# Patient Record
Sex: FEMALE | Race: BLACK OR AFRICAN AMERICAN | Employment: FULL TIME | URBAN - METROPOLITAN AREA
[De-identification: names, ages, dates, MRNs, and addresses within clinical notes are randomized per-mention and may not be internally consistent; named-entity substitution may affect disease eponyms.]

---

## 2017-02-10 ENCOUNTER — OFFICE VISIT (OUTPATIENT)
Dept: OBGYN CLINIC | Facility: CLINIC | Age: 37
End: 2017-02-10

## 2017-02-10 ENCOUNTER — APPOINTMENT (OUTPATIENT)
Dept: LAB | Facility: REFERENCE LAB | Age: 37
End: 2017-02-10
Attending: OBSTETRICS & GYNECOLOGY
Payer: COMMERCIAL

## 2017-02-10 VITALS
SYSTOLIC BLOOD PRESSURE: 112 MMHG | BODY MASS INDEX: 23.07 KG/M2 | DIASTOLIC BLOOD PRESSURE: 70 MMHG | WEIGHT: 135.13 LBS | HEIGHT: 64 IN

## 2017-02-10 DIAGNOSIS — T83.32XA MALPOSITIONED IUD, INITIAL ENCOUNTER: ICD-10-CM

## 2017-02-10 DIAGNOSIS — R35.0 FREQUENT URINATION: Primary | ICD-10-CM

## 2017-02-10 DIAGNOSIS — R35.0 FREQUENT URINATION: ICD-10-CM

## 2017-02-10 DIAGNOSIS — C50.919 MALIGNANT NEOPLASM OF FEMALE BREAST, UNSPECIFIED LATERALITY, UNSPECIFIED SITE OF BREAST: ICD-10-CM

## 2017-02-10 DIAGNOSIS — Z01.411 ABNORMAL GYNECOLOGICAL EXAMINATION: ICD-10-CM

## 2017-02-10 DIAGNOSIS — N93.9 VAGINAL SPOTTING: ICD-10-CM

## 2017-02-10 DIAGNOSIS — Z12.4 ROUTINE CERVICAL SMEAR: ICD-10-CM

## 2017-02-10 LAB
APPEARANCE: CLEAR
BILIRUBIN: NEGATIVE
GLUCOSE (URINE DIPSTICK): NEGATIVE MG/DL
KETONES (URINE DIPSTICK): NEGATIVE MG/DL
MULTISTIX LOT#: NORMAL NUMERIC
NITRITE, URINE: NEGATIVE
OCCULT BLOOD: NEGATIVE
PH, URINE: 7 (ref 4.5–8)
PROTEIN (URINE DIPSTICK): NEGATIVE MG/DL
SPECIFIC GRAVITY: 1.02 (ref 1–1.03)
URINE-COLOR: YELLOW
UROBILINOGEN,SEMI-QN: 1 MG/DL (ref 0–1.9)

## 2017-02-10 PROCEDURE — 81002 URINALYSIS NONAUTO W/O SCOPE: CPT | Performed by: OBSTETRICS & GYNECOLOGY

## 2017-02-10 PROCEDURE — 87077 CULTURE AEROBIC IDENTIFY: CPT

## 2017-02-10 PROCEDURE — 87086 URINE CULTURE/COLONY COUNT: CPT

## 2017-02-10 PROCEDURE — 88175 CYTOPATH C/V AUTO FLUID REDO: CPT | Performed by: OBSTETRICS & GYNECOLOGY

## 2017-02-10 PROCEDURE — 58301 REMOVE INTRAUTERINE DEVICE: CPT | Performed by: OBSTETRICS & GYNECOLOGY

## 2017-02-10 PROCEDURE — 99395 PREV VISIT EST AGE 18-39: CPT | Performed by: OBSTETRICS & GYNECOLOGY

## 2017-02-10 PROCEDURE — 87186 SC STD MICRODIL/AGAR DIL: CPT

## 2017-02-10 PROCEDURE — 87624 HPV HI-RISK TYP POOLED RSLT: CPT | Performed by: OBSTETRICS & GYNECOLOGY

## 2017-02-10 RX ORDER — TAMOXIFEN CITRATE 20 MG/1
TABLET ORAL
Refills: 11 | COMMUNITY
Start: 2017-02-01

## 2017-02-10 RX ORDER — GABAPENTIN 300 MG/1
CAPSULE ORAL
Refills: 11 | COMMUNITY
Start: 2017-02-01 | End: 2019-02-08

## 2017-02-10 RX ORDER — ZOLPIDEM TARTRATE 5 MG/1
TABLET ORAL
Refills: 5 | COMMUNITY
Start: 2017-01-09 | End: 2020-08-20 | Stop reason: DRUGHIGH

## 2017-02-10 NOTE — PROGRESS NOTES
GYN H&P     2/10/2017  3:23 PM    CC:Patient presents for annual exam    HPI: Patient is a 43year old  here for annual gyn exam with complaints of increased urinary frequency and recent postcoital vaginal spotting. Cycles absent on Tamoxifen.  No pe of Education: N/A  Number of Children: N/A     Occupational History  None on file     Social History Main Topics   Smoking status: Never Smoker     Smokeless tobacco: Not on file    Alcohol Use: No    Drug Use: No    Sexual Activity: No   No   Comment: [facility] laterality[facility]    3. Vaginal spotting    4. Malpositioned IUD, initial encounter    5. Abnormal gynecological examination          1. Frequent urination  -- Urine Dip in office [76263]  - Urinalysis, Routine;  Future  - Urine C

## 2017-02-13 LAB — HPV I/H RISK 1 DNA SPEC QL NAA+PROBE: NEGATIVE

## 2017-02-14 RX ORDER — NITROFURANTOIN 25; 75 MG/1; MG/1
100 CAPSULE ORAL 2 TIMES DAILY
Qty: 20 CAPSULE | Refills: 0 | Status: SHIPPED | OUTPATIENT
Start: 2017-02-14 | End: 2017-02-24

## 2017-02-17 ENCOUNTER — MED REC SCAN ONLY (OUTPATIENT)
Dept: OBGYN CLINIC | Facility: CLINIC | Age: 37
End: 2017-02-17

## 2019-02-08 ENCOUNTER — OFFICE VISIT (OUTPATIENT)
Dept: OBGYN CLINIC | Facility: CLINIC | Age: 39
End: 2019-02-08
Payer: COMMERCIAL

## 2019-02-08 ENCOUNTER — LAB ENCOUNTER (OUTPATIENT)
Dept: LAB | Facility: REFERENCE LAB | Age: 39
End: 2019-02-08
Attending: OBSTETRICS & GYNECOLOGY
Payer: COMMERCIAL

## 2019-02-08 VITALS
BODY MASS INDEX: 23.58 KG/M2 | DIASTOLIC BLOOD PRESSURE: 60 MMHG | WEIGHT: 138.13 LBS | HEIGHT: 64 IN | SYSTOLIC BLOOD PRESSURE: 108 MMHG

## 2019-02-08 DIAGNOSIS — Z11.3 SCREENING EXAMINATION FOR VENEREAL DISEASE: ICD-10-CM

## 2019-02-08 DIAGNOSIS — Z85.3 HISTORY OF BILATERAL BREAST CANCER: ICD-10-CM

## 2019-02-08 DIAGNOSIS — Z01.419 WOMEN'S ANNUAL ROUTINE GYNECOLOGICAL EXAMINATION: Primary | ICD-10-CM

## 2019-02-08 DIAGNOSIS — Z12.4 SCREENING FOR MALIGNANT NEOPLASM OF THE CERVIX: ICD-10-CM

## 2019-02-08 PROBLEM — C50.919 BREAST CANCER (HCC): Status: ACTIVE | Noted: 2017-02-10

## 2019-02-08 PROCEDURE — 87624 HPV HI-RISK TYP POOLED RSLT: CPT | Performed by: OBSTETRICS & GYNECOLOGY

## 2019-02-08 PROCEDURE — 87591 N.GONORRHOEAE DNA AMP PROB: CPT | Performed by: OBSTETRICS & GYNECOLOGY

## 2019-02-08 PROCEDURE — 99396 PREV VISIT EST AGE 40-64: CPT | Performed by: OBSTETRICS & GYNECOLOGY

## 2019-02-08 PROCEDURE — 36415 COLL VENOUS BLD VENIPUNCTURE: CPT

## 2019-02-08 PROCEDURE — 87491 CHLMYD TRACH DNA AMP PROBE: CPT | Performed by: OBSTETRICS & GYNECOLOGY

## 2019-02-08 PROCEDURE — 87389 HIV-1 AG W/HIV-1&-2 AB AG IA: CPT

## 2019-02-08 PROCEDURE — 86780 TREPONEMA PALLIDUM: CPT

## 2019-02-08 PROCEDURE — 87340 HEPATITIS B SURFACE AG IA: CPT

## 2019-02-08 RX ORDER — DIAZEPAM 5 MG/1
TABLET ORAL
Refills: 0 | COMMUNITY
Start: 2018-11-29 | End: 2022-01-05

## 2019-02-08 RX ORDER — IBUPROFEN 600 MG/1
TABLET ORAL
Refills: 5 | COMMUNITY
Start: 2018-09-14

## 2019-02-08 RX ORDER — LORAZEPAM 1 MG/1
TABLET ORAL
Refills: 5 | COMMUNITY
Start: 2018-10-03

## 2019-02-08 RX ORDER — EPINEPHRINE 0.3 MG/.3ML
0.3 INJECTION SUBCUTANEOUS
COMMUNITY
Start: 2018-08-30

## 2019-02-08 RX ORDER — ALBUTEROL SULFATE 90 UG/1
2 AEROSOL, METERED RESPIRATORY (INHALATION)
COMMUNITY
Start: 2014-11-21

## 2019-02-08 NOTE — PROGRESS NOTES
GYN ANNUAL    2/8/2019  3:39 PM    CC:Patient presents for annual exam    HPI: Patient is a 34year old [id number] LMP 2016 here for annual gyn exam and PAP.  Recently underwent left mastectomy for ER/MD+ breast cancer with reconstruction that occurred on Tamo (IUD) 01/21/2014    removed 02/10/2017   • Human papilloma virus infection    • Low grade squamous intraepithelial lesion (LGSIL) on Papanicolaou smear of cervix 2010   • Yeast infection      Past Surgical History:   Procedure Laterality Date   • ANESTHESI normal  LUNGS: respiration unlabored  CARDIOVASCULAR: no peripheral edema or varicosities, skin warm and dry  BREASTS: bilateral well-healed reconstructed breasts  nABDOMEN: Soft, non distended; non tender, no masses  GYNE/:   External Genitalia: normal,

## 2019-02-10 LAB
C TRACH DNA SPEC QL NAA+PROBE: NEGATIVE
HPV I/H RISK 1 DNA SPEC QL NAA+PROBE: NEGATIVE
N GONORRHOEA DNA SPEC QL NAA+PROBE: NEGATIVE

## 2019-02-11 LAB
HBV SURFACE AG SERPL QL IA: NONREACTIVE
HIV1+2 AB SERPL QL IA: NONREACTIVE
T PALLIDUM AB SER QL: NEGATIVE

## 2019-02-12 NOTE — PROGRESS NOTES
Message sent to pt's mychart with negative blood test results. Pt to call if she has any questions or concerns.

## 2020-08-13 ENCOUNTER — OFFICE VISIT (OUTPATIENT)
Dept: OBGYN CLINIC | Facility: CLINIC | Age: 40
End: 2020-08-13
Payer: COMMERCIAL

## 2020-08-13 ENCOUNTER — LAB ENCOUNTER (OUTPATIENT)
Dept: LAB | Facility: REFERENCE LAB | Age: 40
End: 2020-08-13
Attending: OBSTETRICS & GYNECOLOGY
Payer: COMMERCIAL

## 2020-08-13 VITALS
DIASTOLIC BLOOD PRESSURE: 68 MMHG | HEIGHT: 64 IN | SYSTOLIC BLOOD PRESSURE: 102 MMHG | BODY MASS INDEX: 24.59 KG/M2 | WEIGHT: 144 LBS

## 2020-08-13 DIAGNOSIS — F52.0 DECREASED SEXUAL DESIRE: ICD-10-CM

## 2020-08-13 DIAGNOSIS — Z11.3 ROUTINE SCREENING FOR STI (SEXUALLY TRANSMITTED INFECTION): Primary | ICD-10-CM

## 2020-08-13 DIAGNOSIS — Z11.3 ROUTINE SCREENING FOR STI (SEXUALLY TRANSMITTED INFECTION): ICD-10-CM

## 2020-08-13 DIAGNOSIS — N89.8 VAGINAL DISCHARGE: ICD-10-CM

## 2020-08-13 DIAGNOSIS — N89.8 VAGINAL DRYNESS: ICD-10-CM

## 2020-08-13 PROCEDURE — 3074F SYST BP LT 130 MM HG: CPT | Performed by: OBSTETRICS & GYNECOLOGY

## 2020-08-13 PROCEDURE — 99214 OFFICE O/P EST MOD 30 MIN: CPT | Performed by: OBSTETRICS & GYNECOLOGY

## 2020-08-13 PROCEDURE — 87591 N.GONORRHOEAE DNA AMP PROB: CPT | Performed by: OBSTETRICS & GYNECOLOGY

## 2020-08-13 PROCEDURE — 87106 FUNGI IDENTIFICATION YEAST: CPT | Performed by: OBSTETRICS & GYNECOLOGY

## 2020-08-13 PROCEDURE — 3008F BODY MASS INDEX DOCD: CPT | Performed by: OBSTETRICS & GYNECOLOGY

## 2020-08-13 PROCEDURE — 87624 HPV HI-RISK TYP POOLED RSLT: CPT | Performed by: OBSTETRICS & GYNECOLOGY

## 2020-08-13 PROCEDURE — 86780 TREPONEMA PALLIDUM: CPT

## 2020-08-13 PROCEDURE — 87389 HIV-1 AG W/HIV-1&-2 AB AG IA: CPT

## 2020-08-13 PROCEDURE — 3078F DIAST BP <80 MM HG: CPT | Performed by: OBSTETRICS & GYNECOLOGY

## 2020-08-13 PROCEDURE — 87491 CHLMYD TRACH DNA AMP PROBE: CPT | Performed by: OBSTETRICS & GYNECOLOGY

## 2020-08-13 PROCEDURE — 87205 SMEAR GRAM STAIN: CPT | Performed by: OBSTETRICS & GYNECOLOGY

## 2020-08-13 PROCEDURE — 36415 COLL VENOUS BLD VENIPUNCTURE: CPT

## 2020-08-13 PROCEDURE — 87808 TRICHOMONAS ASSAY W/OPTIC: CPT | Performed by: OBSTETRICS & GYNECOLOGY

## 2020-08-13 RX ORDER — FLUCONAZOLE 150 MG/1
150 TABLET ORAL ONCE
Qty: 2 TABLET | Refills: 0 | Status: SHIPPED | OUTPATIENT
Start: 2020-08-20 | End: 2020-08-20

## 2020-08-13 RX ORDER — METRONIDAZOLE 500 MG/1
500 TABLET ORAL 2 TIMES DAILY
Qty: 14 TABLET | Refills: 0 | Status: SHIPPED | OUTPATIENT
Start: 2020-08-13 | End: 2020-08-20

## 2020-08-13 NOTE — PROGRESS NOTES
[facility]  Obstetrics and Gynecology  Follow Up    Subjective:     Jan Diop is a 37year old [id number] female who was last seen in office 2/8/2019 and presents with c/o vaginal discharge.  The patient reports about 3 da support  Urethra: meatus normal   Bladder: well supported  Vagina: inflamed mucosa, no lesions, significant amount of thick brown/yellow discharge  discharge   Uterus: normal size, mobile, nontender  Cervix: normal os, no lesions, friable with collection o understanding and agrees with the plan of care. All questions were answered to the best of my ability at this time.     RTC in 1 year or sooner if needed    Dr. Ginger Sutherland MD    [facility]      Total face to face time was 25 minutes, more than 50% of the

## 2020-08-14 ENCOUNTER — TELEPHONE (OUTPATIENT)
Dept: OBGYN CLINIC | Facility: CLINIC | Age: 40
End: 2020-08-14

## 2020-08-14 LAB
C TRACH DNA SPEC QL NAA+PROBE: NEGATIVE
HPV I/H RISK 1 DNA SPEC QL NAA+PROBE: POSITIVE
N GONORRHOEA DNA SPEC QL NAA+PROBE: NEGATIVE
T PALLIDUM AB SER QL: NEGATIVE

## 2020-08-14 NOTE — PROGRESS NOTES
Results reviewed. Released to [address] Please inform of negative BV and trichomonas. I still recommend that she finish the course of antibiotics that I prescribed yesterday due to her abnormal discharge.       Dr. Adelia Hernandez MD    [facility]

## 2020-08-14 NOTE — TELEPHONE ENCOUNTER
Pt viewed test results and instructions on mychart. JN's msg relayed to pt. Pt verbalized understanding and agrees with POC. [place]  8/14/2020 12:06 PM      LM on voicemail for pt to call back. Faye Shannon MD  8/14/2020 10:53 AM      Results reviewed. Released to [address] Please inform of negative BV and trichomonas. I still recommend that she finish the course of antibiotics that I prescribed yesterday due to her abnormal discharge.  MD Sintia Nuñez Dr. [facility]

## 2020-08-15 LAB
GENITAL VAGINOSIS SCREEN: NEGATIVE
TRICHOMONAS SCREEN: NEGATIVE

## 2020-08-18 ENCOUNTER — TELEPHONE (OUTPATIENT)
Dept: OBGYN CLINIC | Facility: CLINIC | Age: 40
End: 2020-08-18

## 2020-08-18 NOTE — TELEPHONE ENCOUNTER
Per RUDOLPH, pt to have colpo d/t pap results: +HPV and ASCUS. Pt scheduled for colpo on 8/20/2020 pre procedure, procedure and recovery reviewed with pt. All of pt's questions answered to pt's satisfaction. Pt has additional questions though and would like to speak with JN. Informed pt to expect call from [facility] tomorrow as he is not in office today. Pt states she had stage 4 breast cancer and is concerned d/t test results. RUDOLPH notified to call pt tomorrow. Pt verbalized understanding and agrees with POC.

## 2020-08-18 NOTE — TELEPHONE ENCOUNTER
Pt is calling bc she has questions about her test results she had done on the 13th with Dr Yoselin Treviño   Please call back

## 2020-08-19 ENCOUNTER — TELEPHONE (OUTPATIENT)
Dept: OBGYN CLINIC | Facility: CLINIC | Age: 40
End: 2020-08-19

## 2020-08-19 NOTE — TELEPHONE ENCOUNTER
Telephone call:     Called pt to discuss pap results and HPV positive. P understood. Recommended colposcopy and pt agreed. Discussed vaginal culture results and recommendation for flagyl for 7 days followed by diflucan for 1 dose.  Pt understood and yoly

## 2020-08-20 ENCOUNTER — OFFICE VISIT (OUTPATIENT)
Dept: OBGYN CLINIC | Facility: CLINIC | Age: 40
End: 2020-08-20
Payer: COMMERCIAL

## 2020-08-20 VITALS
HEIGHT: 64 IN | SYSTOLIC BLOOD PRESSURE: 122 MMHG | DIASTOLIC BLOOD PRESSURE: 60 MMHG | WEIGHT: 144 LBS | BODY MASS INDEX: 24.59 KG/M2

## 2020-08-20 DIAGNOSIS — R87.810 ASCUS WITH POSITIVE HIGH RISK HPV CERVICAL: Primary | ICD-10-CM

## 2020-08-20 DIAGNOSIS — R87.610 ASCUS WITH POSITIVE HIGH RISK HPV CERVICAL: Primary | ICD-10-CM

## 2020-08-20 PROCEDURE — 3008F BODY MASS INDEX DOCD: CPT | Performed by: OBSTETRICS & GYNECOLOGY

## 2020-08-20 PROCEDURE — 3078F DIAST BP <80 MM HG: CPT | Performed by: OBSTETRICS & GYNECOLOGY

## 2020-08-20 PROCEDURE — 88305 TISSUE EXAM BY PATHOLOGIST: CPT | Performed by: OBSTETRICS & GYNECOLOGY

## 2020-08-20 PROCEDURE — 81025 URINE PREGNANCY TEST: CPT | Performed by: OBSTETRICS & GYNECOLOGY

## 2020-08-20 PROCEDURE — 57454 BX/CURETT OF CERVIX W/SCOPE: CPT | Performed by: OBSTETRICS & GYNECOLOGY

## 2020-08-20 PROCEDURE — 3074F SYST BP LT 130 MM HG: CPT | Performed by: OBSTETRICS & GYNECOLOGY

## 2020-08-20 RX ORDER — ZOLPIDEM TARTRATE 10 MG/1
TABLET ORAL
COMMUNITY
Start: 2020-08-18

## 2020-08-20 NOTE — PROCEDURES
COLPOSCOPY:   /60   Ht 64\"   Wt 144 lb (65.3 kg)   BMI 24.72 kg/m²   Age: 37year old  No LMP recorded. (Menstrual status: Chemo).   Pregnant: No  History of STDs: HPV  Smoker: No    Last Pap smear: 08/13/2020  Prior treatment/history of abnormal pap

## 2020-08-21 ENCOUNTER — TELEPHONE (OUTPATIENT)
Dept: OBGYN CLINIC | Facility: CLINIC | Age: 40
End: 2020-08-21

## 2020-08-21 NOTE — TELEPHONE ENCOUNTER
Telephone call:     Called pt to offer HPV vaccination series as a preventative measure for her cervical dysplasia. Non identifiable voicemail. Will send message/ call back.      Dr. Sukhi Perez MD    [place] [address] [facility]

## 2020-08-21 NOTE — TELEPHONE ENCOUNTER
Telephone call:     Called pt to discuss Gardasil vaccination. Discussed that the age limit has been increased to 39 and it has been recommended by the [address] for women with cervical dysplasia and positive HPV. Patient agreed.  Informed

## 2020-08-21 NOTE — TELEPHONE ENCOUNTER
Called pt and provided appointment for Gardasil #1 for Wednesday Aug. 26, 2020. Pt verbalized understanding.

## 2020-11-05 ENCOUNTER — OFFICE VISIT (OUTPATIENT)
Dept: OBGYN CLINIC | Facility: CLINIC | Age: 40
End: 2020-11-05
Payer: COMMERCIAL

## 2020-11-05 VITALS — BODY MASS INDEX: 25 KG/M2 | SYSTOLIC BLOOD PRESSURE: 112 MMHG | DIASTOLIC BLOOD PRESSURE: 74 MMHG | HEIGHT: 64 IN

## 2020-11-05 DIAGNOSIS — N89.8 VAGINAL DISCHARGE: Primary | ICD-10-CM

## 2020-11-05 DIAGNOSIS — N89.8 VAGINAL IRRITATION: ICD-10-CM

## 2020-11-05 PROCEDURE — 3078F DIAST BP <80 MM HG: CPT | Performed by: OBSTETRICS & GYNECOLOGY

## 2020-11-05 PROCEDURE — 3074F SYST BP LT 130 MM HG: CPT | Performed by: OBSTETRICS & GYNECOLOGY

## 2020-11-05 PROCEDURE — 87808 TRICHOMONAS ASSAY W/OPTIC: CPT | Performed by: OBSTETRICS & GYNECOLOGY

## 2020-11-05 PROCEDURE — 87106 FUNGI IDENTIFICATION YEAST: CPT | Performed by: OBSTETRICS & GYNECOLOGY

## 2020-11-05 PROCEDURE — 99212 OFFICE O/P EST SF 10 MIN: CPT | Performed by: OBSTETRICS & GYNECOLOGY

## 2020-11-05 PROCEDURE — 99072 ADDL SUPL MATRL&STAF TM PHE: CPT | Performed by: OBSTETRICS & GYNECOLOGY

## 2020-11-05 PROCEDURE — 87205 SMEAR GRAM STAIN: CPT | Performed by: OBSTETRICS & GYNECOLOGY

## 2020-11-05 RX ORDER — LEVOTHYROXINE SODIUM 0.1 MG/1
100 TABLET ORAL EVERY MORNING
COMMUNITY
Start: 2020-09-28 | End: 2022-01-05 | Stop reason: DRUGHIGH

## 2020-11-05 NOTE — PROGRESS NOTES
[address] [facility]  Obstetrics and Gynecology  Follow Up    Subjective:     Chelesa Murray is a 37year old [id number] female who was last seen in office 08/2020 and presents with c/o vaginal discharge.  The patient reports her vaginal edema    Labs:  Vaginal culture 8/13/2020:   Genital vaginosis screen   Negative Negative    Trichomonas screen   Negative Negative    Candida Screen   Negative for Candida 3+ Candida albicansAbnormal         Assessment:     Matthias Bonner is a 37 year o

## 2020-11-07 ENCOUNTER — TELEPHONE (OUTPATIENT)
Dept: OBGYN CLINIC | Facility: CLINIC | Age: 40
End: 2020-11-07

## 2020-11-07 RX ORDER — FLUCONAZOLE 150 MG/1
TABLET ORAL
Qty: 2 TABLET | Refills: 0 | Status: SHIPPED | OUTPATIENT
Start: 2020-11-07 | End: 2022-01-05 | Stop reason: ALTCHOICE

## 2020-11-07 NOTE — TELEPHONE ENCOUNTER
Telephone call:     Called pt to inform of positive yeast screen and diflucan sent to her pharmacy. Pt understood.      Dr. Chavez Platt MD    [place] [address] [facility]

## 2022-01-05 ENCOUNTER — OFFICE VISIT (OUTPATIENT)
Dept: OBGYN CLINIC | Facility: CLINIC | Age: 42
End: 2022-01-05
Payer: COMMERCIAL

## 2022-01-05 VITALS
BODY MASS INDEX: 23.75 KG/M2 | SYSTOLIC BLOOD PRESSURE: 100 MMHG | WEIGHT: 139.13 LBS | DIASTOLIC BLOOD PRESSURE: 64 MMHG | HEIGHT: 64 IN

## 2022-01-05 DIAGNOSIS — Z11.3 ROUTINE SCREENING FOR STI (SEXUALLY TRANSMITTED INFECTION): ICD-10-CM

## 2022-01-05 DIAGNOSIS — Z01.419 WOMEN'S ANNUAL ROUTINE GYNECOLOGICAL EXAMINATION: Primary | ICD-10-CM

## 2022-01-05 DIAGNOSIS — Z12.4 ROUTINE CERVICAL SMEAR: ICD-10-CM

## 2022-01-05 PROBLEM — N87.0 DYSPLASIA OF CERVIX, LOW GRADE (CIN 1): Status: ACTIVE | Noted: 2022-01-05

## 2022-01-05 PROBLEM — R87.810 CERVICAL HIGH RISK HUMAN PAPILLOMAVIRUS (HPV) DNA TEST POSITIVE: Status: ACTIVE | Noted: 2022-01-05

## 2022-01-05 PROCEDURE — 87624 HPV HI-RISK TYP POOLED RSLT: CPT | Performed by: OBSTETRICS & GYNECOLOGY

## 2022-01-05 PROCEDURE — 3008F BODY MASS INDEX DOCD: CPT | Performed by: OBSTETRICS & GYNECOLOGY

## 2022-01-05 PROCEDURE — 87491 CHLMYD TRACH DNA AMP PROBE: CPT | Performed by: OBSTETRICS & GYNECOLOGY

## 2022-01-05 PROCEDURE — 3074F SYST BP LT 130 MM HG: CPT | Performed by: OBSTETRICS & GYNECOLOGY

## 2022-01-05 PROCEDURE — 88175 CYTOPATH C/V AUTO FLUID REDO: CPT | Performed by: OBSTETRICS & GYNECOLOGY

## 2022-01-05 PROCEDURE — 99396 PREV VISIT EST AGE 40-64: CPT | Performed by: OBSTETRICS & GYNECOLOGY

## 2022-01-05 PROCEDURE — 87591 N.GONORRHOEAE DNA AMP PROB: CPT | Performed by: OBSTETRICS & GYNECOLOGY

## 2022-01-05 PROCEDURE — 3078F DIAST BP <80 MM HG: CPT | Performed by: OBSTETRICS & GYNECOLOGY

## 2022-01-05 RX ORDER — LEVOTHYROXINE SODIUM 0.07 MG/1
75 TABLET ORAL EVERY MORNING
COMMUNITY
Start: 2021-12-20

## 2022-01-05 NOTE — PROGRESS NOTES
GYN ANNUAL    1/5/2022  5:09 PM    Patient presents with: Annual: annual exam and pap   . HPI: Patient is a 35year old [id number] LMP 10/8/16 presents for annual gyn exam and PAP after colposcopy 8/2020 showing NIKA!.  Doing well with all negative breast c Low grade squamous intraepithelial lesion (LGSIL) on Papanicolaou smear of cervix    • Yeast infection      Past Surgical History:   Procedure Laterality Date   • ANESTHESIA FOR;  PROCEDURES     • ANESTHESIA FOR;  PROCEDURES     • BREAS normal  Bladder: well supported  Vagina: normal mucosa, no lesions, mucoid discharge   Uterus: normal size, mobile, nontender  Cervix: normal os, no lesions or bleeding  Adnexa: normal size, bilaterally nontender, no palpable masses  Cul-de-sac: normal  R/

## 2022-01-06 LAB
C TRACH DNA SPEC QL NAA+PROBE: NEGATIVE
N GONORRHOEA DNA SPEC QL NAA+PROBE: NEGATIVE

## 2022-01-07 LAB — HPV I/H RISK 1 DNA SPEC QL NAA+PROBE: POSITIVE

## 2022-12-07 ENCOUNTER — OFFICE VISIT (OUTPATIENT)
Dept: OBGYN CLINIC | Facility: CLINIC | Age: 42
End: 2022-12-07
Payer: COMMERCIAL

## 2022-12-07 VITALS
BODY MASS INDEX: 25.46 KG/M2 | HEIGHT: 64 IN | SYSTOLIC BLOOD PRESSURE: 114 MMHG | DIASTOLIC BLOOD PRESSURE: 60 MMHG | WEIGHT: 149.13 LBS

## 2022-12-07 DIAGNOSIS — Z92.29 HISTORY OF TAMOXIFEN THERAPY: ICD-10-CM

## 2022-12-07 DIAGNOSIS — N92.6 IRREGULAR MENSES: Primary | ICD-10-CM

## 2022-12-07 PROCEDURE — 3074F SYST BP LT 130 MM HG: CPT | Performed by: OBSTETRICS & GYNECOLOGY

## 2022-12-07 PROCEDURE — 99213 OFFICE O/P EST LOW 20 MIN: CPT | Performed by: OBSTETRICS & GYNECOLOGY

## 2022-12-07 PROCEDURE — 3078F DIAST BP <80 MM HG: CPT | Performed by: OBSTETRICS & GYNECOLOGY

## 2022-12-07 PROCEDURE — 3008F BODY MASS INDEX DOCD: CPT | Performed by: OBSTETRICS & GYNECOLOGY

## 2022-12-08 ENCOUNTER — LAB ENCOUNTER (OUTPATIENT)
Dept: LAB | Age: 42
End: 2022-12-08
Attending: OBSTETRICS & GYNECOLOGY
Payer: COMMERCIAL

## 2022-12-08 ENCOUNTER — ULTRASOUND ENCOUNTER (OUTPATIENT)
Dept: OBGYN CLINIC | Facility: CLINIC | Age: 42
End: 2022-12-08
Payer: COMMERCIAL

## 2022-12-08 DIAGNOSIS — Z01.419 WOMEN'S ANNUAL ROUTINE GYNECOLOGICAL EXAMINATION: ICD-10-CM

## 2022-12-08 DIAGNOSIS — Z11.3 ROUTINE SCREENING FOR STI (SEXUALLY TRANSMITTED INFECTION): ICD-10-CM

## 2022-12-08 DIAGNOSIS — N92.6 IRREGULAR MENSES: ICD-10-CM

## 2022-12-08 LAB
FSH SERPL-ACNC: 37.7 MIU/ML
HBV SURFACE AG SER-ACNC: <0.1 [IU]/L
HBV SURFACE AG SERPL QL IA: NONREACTIVE

## 2022-12-08 PROCEDURE — 36415 COLL VENOUS BLD VENIPUNCTURE: CPT

## 2022-12-08 PROCEDURE — 86780 TREPONEMA PALLIDUM: CPT

## 2022-12-08 PROCEDURE — 87389 HIV-1 AG W/HIV-1&-2 AB AG IA: CPT

## 2022-12-08 PROCEDURE — 83001 ASSAY OF GONADOTROPIN (FSH): CPT

## 2022-12-08 PROCEDURE — 87340 HEPATITIS B SURFACE AG IA: CPT

## 2022-12-09 LAB — T PALLIDUM AB SER QL: NEGATIVE

## 2022-12-14 ENCOUNTER — OFFICE VISIT (OUTPATIENT)
Dept: OBGYN CLINIC | Facility: CLINIC | Age: 42
End: 2022-12-14
Payer: COMMERCIAL

## 2022-12-14 DIAGNOSIS — Z32.02 PREGNANCY EXAMINATION OR TEST, NEGATIVE RESULT: ICD-10-CM

## 2022-12-14 DIAGNOSIS — N95.0 POSTMENOPAUSAL BLEEDING: ICD-10-CM

## 2022-12-14 DIAGNOSIS — Z92.29 HISTORY OF TAMOXIFEN THERAPY: Primary | ICD-10-CM

## 2022-12-14 LAB
CONTROL LINE PRESENT WITH A CLEAR BACKGROUND (YES/NO): YES YES/NO
PREGNANCY TEST, URINE: NEGATIVE

## 2022-12-14 PROCEDURE — 81025 URINE PREGNANCY TEST: CPT | Performed by: OBSTETRICS & GYNECOLOGY

## 2022-12-14 PROCEDURE — 88305 TISSUE EXAM BY PATHOLOGIST: CPT | Performed by: OBSTETRICS & GYNECOLOGY

## 2022-12-14 PROCEDURE — 58100 BIOPSY OF UTERUS LINING: CPT | Performed by: OBSTETRICS & GYNECOLOGY

## 2022-12-14 NOTE — PROCEDURES
PROCEDURE NOTE               ENDOMETRIAL BIOPSY       Sonal Pinzon MD    Indication:  1) PMB  2) History of Tamoxifen therapy    Procedure explained. Risks and benefits reviewed. Consent obtained. Sterile speculum placed. Betadine wash x 3 performed. Single tooth tenaculum applied to anterior lip of cervix. Os finder placed then EM pipelle introduced with small amount of tissue obtained. All instruments removed from cervix and vagina. No complications. Patient tolerated procedure well.     Plan:  - EMB to Pathology      Sonal Pinzon MD  12/14/2022  5:01 PM

## 2022-12-21 ENCOUNTER — OFFICE VISIT (OUTPATIENT)
Dept: OBGYN CLINIC | Facility: CLINIC | Age: 42
End: 2022-12-21
Payer: COMMERCIAL

## 2022-12-21 DIAGNOSIS — N95.0 POSTMENOPAUSAL BLEEDING: ICD-10-CM

## 2022-12-21 DIAGNOSIS — Z92.89 HISTORY OF ENDOMETRIAL BIOPSY: ICD-10-CM

## 2022-12-21 DIAGNOSIS — Z92.29 HISTORY OF TAMOXIFEN THERAPY: Primary | ICD-10-CM

## 2022-12-21 PROCEDURE — 99213 OFFICE O/P EST LOW 20 MIN: CPT | Performed by: OBSTETRICS & GYNECOLOGY

## 2023-01-13 ENCOUNTER — TELEPHONE (OUTPATIENT)
Dept: OBGYN CLINIC | Facility: CLINIC | Age: 43
End: 2023-01-13

## 2023-01-13 DIAGNOSIS — Z92.29 HISTORY OF TAMOXIFEN THERAPY: ICD-10-CM

## 2023-01-13 DIAGNOSIS — U07.1 COVID: ICD-10-CM

## 2023-01-13 DIAGNOSIS — N95.0 POSTMENOPAUSAL BLEEDING: Primary | ICD-10-CM

## 2023-01-13 NOTE — TELEPHONE ENCOUNTER
Scheduled 2/9/23 at [address] [place] [address]    ----- Message from Iban Bliss MD sent at 1/13/2023  9:19 AM CST -----  Regarding: surgery    Please schedule the following surgery:    Procedure:Diagnostic D&C  Assist: (Y/N or none)N  Date:   2/9/23                                   Time Requested: 7AM  Dx:   1) N95.0 PMB  2) Z92.29 History of Tamoxifen  Pre-op appt: (Y/N or n/a) Y  Admission type: (IN/OUT/OBVS)OUT  Department of discharge(SDS/Floor):SDS  Expected length of stay:0  Procedure length time (please enter amount you are requesting):30 min  Recovery time (patients always ask):  Medical Clearance: (Y/N)N  Post- Op f/u appt time frame: 2w    Pre-op orders (choose one)   Use [facility] procedure driven order set in addition to anesthesia protocol Y   Use [facility] surgeon's personalized order set   Surgeon to enter pre-op orders   No pre-op orders   Use the prophylactic antibiotic protocol   No pre-op antibiotic orders indicated do not give antibiotic, if any, listed on the procedure driven order sets or personalized order sets    PCN allergy Yes___ or No___   If Yes: Proceed with PCN/Cephalosporin recommended antibiotic as benefit outweighs risk     Proceed with PCN/Cephalosporin recommended antibiotic as not a true allergy    Proceed with recommended alternative antibiotic for PCN allergy        ALL Medicaid/including BCBS community: Tubal/ Hyst form MUST be signed (30 days):      Message to nurses:

## 2023-02-06 ENCOUNTER — LAB ENCOUNTER (OUTPATIENT)
Dept: LAB | Age: 43
End: 2023-02-06
Attending: OBSTETRICS & GYNECOLOGY
Payer: COMMERCIAL

## 2023-02-06 DIAGNOSIS — U07.1 COVID: ICD-10-CM

## 2023-02-07 LAB — SARS-COV-2 RNA RESP QL NAA+PROBE: NOT DETECTED

## 2023-02-08 NOTE — DISCHARGE INSTRUCTIONS
[address] [place] [address] Instructions Following Your D&C     You may be feeling tired for the next week. You may exercise if you feel up to it. You may maintain a general diet and resume your home medications as instructed. Wait 1 day before restarting to take aspirin. For the next 2 weeks:  Pelvic Rest  No sex, no tampons, no douching    Wound Care  Soap and Water Daily. Pat dry, do not rub   Shower only for the next 2 weeks. You may return to work or school within 1-14 days, depending on your preference. Call our office if you have a fever above 100.5, pain that is not relieved by pain medication, or bleeding more than a light period. AMBSURG HOME CARE INSTRUCTIONS: POST-OP ANESTHESIA  The medication that you received for sedation or general anesthesia can last up to 24 hours. Your judgment and reflexes may be altered, even if you feel like your normal self. We Recommend:   Do not drive any motor vehicle or bicycle   Avoid mowing the lawn, playing sports, or working with power tools/applicances (power saws, electric knives or mixers)   That you have someone stay with you on your first night home   Do not drink alcohol or take sleeping pills or tranquilizers   Do not sign legal documents within 24 hours of your procedure   If you had a nerve block for your surgery, take extra care not to put any pressure on your arm or hand for 24 hours    It is normal:  For you to have a sore throat if you had a breathing tube during surgery (while you were asleep!). The sore throat should get better within 48 hours. You can gargle with warm salt water (1/2 tsp in 4 oz warm water) or use a throat lozenge for comfort  To feel muscle aches or soreness especially in the abdomen, chest or neck. The achy feeling should go away in the next 24 hours  To feel weak, sleepy or \"wiped out\". Your should start feeling better in the next 24 hours.    To experience mild discomforts such as sore lip or tongue, headache, cramps, gas pains or a bloated feeling in your abdomen. To experience mild back pain or soreness for a day or two if you had spinal or epidural anesthesia. If you had laparoscopic surgery, to feel shoulder pain or discomfort on the day of surgery. For some patients to have nausea after surgery/anesthesia    If you feel nausea or experience vomiting:   Try to move around less.    Eat less than usual or drink only liquids until the next morning   Nausea should resolve in about 24 hours    If you have a problem when you are at home:    Call your surgeons office

## 2023-02-09 ENCOUNTER — ANESTHESIA EVENT (OUTPATIENT)
Dept: SURGERY | Facility: HOSPITAL | Age: 43
End: 2023-02-09
Payer: COMMERCIAL

## 2023-02-09 ENCOUNTER — HOSPITAL ENCOUNTER (OUTPATIENT)
Facility: HOSPITAL | Age: 43
Setting detail: HOSPITAL OUTPATIENT SURGERY
Discharge: HOME | End: 2023-02-09
Attending: OBSTETRICS & GYNECOLOGY | Admitting: OBSTETRICS & GYNECOLOGY
Payer: COMMERCIAL

## 2023-02-09 ENCOUNTER — ANESTHESIA (OUTPATIENT)
Dept: SURGERY | Facility: HOSPITAL | Age: 43
End: 2023-02-09
Payer: COMMERCIAL

## 2023-02-09 VITALS
OXYGEN SATURATION: 99 % | BODY MASS INDEX: 23.9 KG/M2 | HEART RATE: 62 BPM | DIASTOLIC BLOOD PRESSURE: 61 MMHG | TEMPERATURE: 98 F | RESPIRATION RATE: 18 BRPM | SYSTOLIC BLOOD PRESSURE: 94 MMHG | WEIGHT: 140 LBS | HEIGHT: 64 IN

## 2023-02-09 DIAGNOSIS — Z92.29 HISTORY OF TAMOXIFEN THERAPY: ICD-10-CM

## 2023-02-09 DIAGNOSIS — N95.0 POSTMENOPAUSAL BLEEDING: ICD-10-CM

## 2023-02-09 PROCEDURE — 58120 DILATION AND CURETTAGE: CPT | Performed by: OBSTETRICS & GYNECOLOGY

## 2023-02-09 PROCEDURE — 0UDB7ZX EXTRACTION OF ENDOMETRIUM, VIA NATURAL OR ARTIFICIAL OPENING, DIAGNOSTIC: ICD-10-PCS | Performed by: OBSTETRICS & GYNECOLOGY

## 2023-02-09 RX ORDER — KETOROLAC TROMETHAMINE 30 MG/ML
INJECTION, SOLUTION INTRAMUSCULAR; INTRAVENOUS AS NEEDED
Status: DISCONTINUED | OUTPATIENT
Start: 2023-02-09 | End: 2023-02-09 | Stop reason: SURG

## 2023-02-09 RX ORDER — HYDROMORPHONE HYDROCHLORIDE 1 MG/ML
0.2 INJECTION, SOLUTION INTRAMUSCULAR; INTRAVENOUS; SUBCUTANEOUS EVERY 5 MIN PRN
Status: DISCONTINUED | OUTPATIENT
Start: 2023-02-09 | End: 2023-02-09

## 2023-02-09 RX ORDER — ONDANSETRON 2 MG/ML
INJECTION INTRAMUSCULAR; INTRAVENOUS AS NEEDED
Status: DISCONTINUED | OUTPATIENT
Start: 2023-02-09 | End: 2023-02-09 | Stop reason: SURG

## 2023-02-09 RX ORDER — HYDROMORPHONE HYDROCHLORIDE 1 MG/ML
0.6 INJECTION, SOLUTION INTRAMUSCULAR; INTRAVENOUS; SUBCUTANEOUS EVERY 5 MIN PRN
Status: DISCONTINUED | OUTPATIENT
Start: 2023-02-09 | End: 2023-02-09

## 2023-02-09 RX ORDER — ACETAMINOPHEN 500 MG
1000 TABLET ORAL ONCE
Status: COMPLETED | OUTPATIENT
Start: 2023-02-09 | End: 2023-02-09

## 2023-02-09 RX ORDER — SODIUM CHLORIDE, SODIUM LACTATE, POTASSIUM CHLORIDE, CALCIUM CHLORIDE 600; 310; 30; 20 MG/100ML; MG/100ML; MG/100ML; MG/100ML
INJECTION, SOLUTION INTRAVENOUS CONTINUOUS
Status: DISCONTINUED | OUTPATIENT
Start: 2023-02-09 | End: 2023-02-09

## 2023-02-09 RX ORDER — ONDANSETRON 4 MG/1
4 TABLET, FILM COATED ORAL EVERY 8 HOURS PRN
OUTPATIENT
Start: 2023-02-09

## 2023-02-09 RX ORDER — LIDOCAINE HYDROCHLORIDE 10 MG/ML
INJECTION, SOLUTION EPIDURAL; INFILTRATION; INTRACAUDAL; PERINEURAL AS NEEDED
Status: DISCONTINUED | OUTPATIENT
Start: 2023-02-09 | End: 2023-02-09 | Stop reason: SURG

## 2023-02-09 RX ORDER — ONDANSETRON 2 MG/ML
4 INJECTION INTRAMUSCULAR; INTRAVENOUS EVERY 8 HOURS PRN
OUTPATIENT
Start: 2023-02-09

## 2023-02-09 RX ORDER — MIDAZOLAM HYDROCHLORIDE 1 MG/ML
INJECTION INTRAMUSCULAR; INTRAVENOUS AS NEEDED
Status: DISCONTINUED | OUTPATIENT
Start: 2023-02-09 | End: 2023-02-09 | Stop reason: SURG

## 2023-02-09 RX ORDER — NALOXONE HYDROCHLORIDE 0.4 MG/ML
80 INJECTION, SOLUTION INTRAMUSCULAR; INTRAVENOUS; SUBCUTANEOUS AS NEEDED
Status: DISCONTINUED | OUTPATIENT
Start: 2023-02-09 | End: 2023-02-09

## 2023-02-09 RX ORDER — MORPHINE SULFATE 4 MG/ML
4 INJECTION, SOLUTION INTRAMUSCULAR; INTRAVENOUS EVERY 10 MIN PRN
Status: DISCONTINUED | OUTPATIENT
Start: 2023-02-09 | End: 2023-02-09

## 2023-02-09 RX ORDER — MORPHINE SULFATE 4 MG/ML
2 INJECTION, SOLUTION INTRAMUSCULAR; INTRAVENOUS EVERY 10 MIN PRN
Status: DISCONTINUED | OUTPATIENT
Start: 2023-02-09 | End: 2023-02-09

## 2023-02-09 RX ORDER — DEXAMETHASONE SODIUM PHOSPHATE 4 MG/ML
VIAL (ML) INJECTION AS NEEDED
Status: DISCONTINUED | OUTPATIENT
Start: 2023-02-09 | End: 2023-02-09 | Stop reason: SURG

## 2023-02-09 RX ORDER — IBUPROFEN 600 MG/1
600 TABLET ORAL ONCE
Status: COMPLETED | OUTPATIENT
Start: 2023-02-09 | End: 2023-02-09

## 2023-02-09 RX ORDER — GLYCOPYRROLATE 0.2 MG/ML
INJECTION, SOLUTION INTRAMUSCULAR; INTRAVENOUS AS NEEDED
Status: DISCONTINUED | OUTPATIENT
Start: 2023-02-09 | End: 2023-02-09 | Stop reason: SURG

## 2023-02-09 RX ORDER — MORPHINE SULFATE 10 MG/ML
6 INJECTION, SOLUTION INTRAMUSCULAR; INTRAVENOUS EVERY 10 MIN PRN
Status: DISCONTINUED | OUTPATIENT
Start: 2023-02-09 | End: 2023-02-09

## 2023-02-09 RX ORDER — HYDROMORPHONE HYDROCHLORIDE 1 MG/ML
0.4 INJECTION, SOLUTION INTRAMUSCULAR; INTRAVENOUS; SUBCUTANEOUS EVERY 5 MIN PRN
Status: DISCONTINUED | OUTPATIENT
Start: 2023-02-09 | End: 2023-02-09

## 2023-02-09 RX ADMIN — SODIUM CHLORIDE, SODIUM LACTATE, POTASSIUM CHLORIDE, CALCIUM CHLORIDE: 600; 310; 30; 20 INJECTION, SOLUTION INTRAVENOUS at 08:47:00

## 2023-02-09 RX ADMIN — MIDAZOLAM HYDROCHLORIDE 2 MG: 1 INJECTION INTRAMUSCULAR; INTRAVENOUS at 08:43:00

## 2023-02-09 RX ADMIN — ONDANSETRON 4 MG: 2 INJECTION INTRAMUSCULAR; INTRAVENOUS at 08:50:00

## 2023-02-09 RX ADMIN — SODIUM CHLORIDE, SODIUM LACTATE, POTASSIUM CHLORIDE, CALCIUM CHLORIDE: 600; 310; 30; 20 INJECTION, SOLUTION INTRAVENOUS at 09:13:00

## 2023-02-09 RX ADMIN — LIDOCAINE HYDROCHLORIDE 50 MG: 10 INJECTION, SOLUTION EPIDURAL; INFILTRATION; INTRACAUDAL; PERINEURAL at 08:47:00

## 2023-02-09 RX ADMIN — KETOROLAC TROMETHAMINE 30 MG: 30 INJECTION, SOLUTION INTRAMUSCULAR; INTRAVENOUS at 09:06:00

## 2023-02-09 RX ADMIN — GLYCOPYRROLATE 0.2 MG: 0.2 INJECTION, SOLUTION INTRAMUSCULAR; INTRAVENOUS at 08:47:00

## 2023-02-09 RX ADMIN — DEXAMETHASONE SODIUM PHOSPHATE 4 MG: 4 MG/ML VIAL (ML) INJECTION at 08:50:00

## 2023-02-09 RX ADMIN — SODIUM CHLORIDE, SODIUM LACTATE, POTASSIUM CHLORIDE, CALCIUM CHLORIDE: 600; 310; 30; 20 INJECTION, SOLUTION INTRAVENOUS at 08:43:00

## 2023-02-09 NOTE — OPERATIVE REPORT
[facility]  Operative Note     Marimar Torrez Location: OR   [facility] [id number] MRN [id number]   Admission Date 2/9/2023 Operation Date 2/9/2023   Attending Physician Giovanni Mclaughlin MD Operating Physician Zahra Castro MD      Preoperative Diagnosis: Postmenopausal bleeding [N95.0]  History of tamoxifen therapy [Z92.29]     Postoperative Diagnosis: Postmenopausal bleeding [N95. 0]History of tamoxifen therapy [Z92.29]     Procedure Performed:   Dilation and curettage, diagnostic     Primary Surgeon: Zahra Castro MD      Surgical Findings: 7 cm uterus with large amount of endometrial and mucoid tissue with clot     Anesthesia: Choice     Complications: None     Specimen: EM curettings     Condition: Good     Estimated Blood Loss: Blood Output: 25 mL (2/9/2023  9:13 AM)    Summary of Case: See dictation       Zahra Castro MD  2/9/2023  9:19 AM

## 2023-02-09 NOTE — OPERATIVE REPORT
[facility]    PATIENT'S NAME: Iliana Dempsey   ATTENDING PHYSICIAN: Melinda Valle MD   OPERATING PHYSICIAN: Melinda Valle MD   PATIENT ACCOUNT#:   [de-identified]    LOCATION:  [facility] PACU 7 EMHP 10  MEDICAL RECORD #:   [id number]       YOB: 1980  ADMISSION DATE:       02/09/2023      OPERATION DATE:  02/09/2023    OPERATIVE REPORT      PREOPERATIVE DIAGNOSIS:  Postmenopausal bleeding with history of tamoxifen therapy. POSTOPERATIVE DIAGNOSIS:  Postmenopausal bleeding with history of tamoxifen therapy. PROCEDURE:  Diagnostic dilation and curettage. ANESTHESIA:  Choice. ESTIMATED BLOOD LOSS:  25 mL. COMPLICATIONS:  None. FINDINGS:  There was a 7 cm uterus with a large amount of endometrial tissue; otherwise normal findings. OPERATIVE TECHNIQUE:  The patient was taken to the operating room, placed on the table in dorsal lithotomy position, was prepped and draped in the usual sterile manner. A bivalve speculum was placed into the vagina. The anterior lip of the cervix was grasped with a toothed tenaculum, and the uterine sound was placed with the findings as dictated above and found to be 7 cm. A Hegar dilator up to 4 mm was placed. A small sharp curette was then introduced into the uterine cavity, and curettings were obtained with a large amount of tissue noted. There were also noted to be some blood clots. Monsel's solution was then applied to the exocervix, and a vaginal packing was placed in the OR to be removed in 1 hour in the postoperative section. All instruments were then removed from the cervix and the vagina. There was good hemostasis at the tenaculum sites on the anterior lip of the cervix. The patient was taken out of the dorsal lithotomy position and was successfully reversed of anesthesia in the operating room. She was then transferred stable to Recovery, having tolerated the procedure well.   All laparotomy, sponge, and instrument counts were correct at the end of the procedure.     Dictated By Rosanna Griffiths MD  d: 02/09/2023 09:22:51  t: 02/09/2023 09:52:04  [facility] [id number]  EB/

## 2023-02-09 NOTE — ANESTHESIA PROCEDURE NOTES
Airway  Date/Time: 2/9/2023 8:50 AM  Urgency: Elective      General Information and Staff    Patient location during procedure: OR  Anesthesiologist: Jory Dawn MD  Performed: anesthesiologist     Indications and Patient Condition  Indications for airway management: anesthesia  Sedation level: deep  Preoxygenated: yes  Patient position: sniffing  Mask difficulty assessment: 0 - not attempted    Final Airway Details  Final airway type: supraglottic airway      Successful airway: classic  Size 4       Number of attempts at approach: 1    Additional Comments  atraumatic

## 2023-02-14 NOTE — PROGRESS NOTES
Released to [employer] and message from provider/results was viewed by patient.    Seen by patient Ashton Alta on 2/13/2023  4:06 PM

## 2023-02-27 ENCOUNTER — OFFICE VISIT (OUTPATIENT)
Dept: SURGERY | Facility: CLINIC | Age: 43
End: 2023-02-27
Payer: COMMERCIAL

## 2023-02-27 VITALS — SYSTOLIC BLOOD PRESSURE: 110 MMHG | DIASTOLIC BLOOD PRESSURE: 71 MMHG

## 2023-02-27 DIAGNOSIS — Z98.890 POST-OPERATIVE STATE: Primary | ICD-10-CM

## 2023-02-27 PROCEDURE — 3078F DIAST BP <80 MM HG: CPT | Performed by: OBSTETRICS & GYNECOLOGY

## 2023-02-27 PROCEDURE — 3074F SYST BP LT 130 MM HG: CPT | Performed by: OBSTETRICS & GYNECOLOGY

## 2023-02-27 PROCEDURE — 99213 OFFICE O/P EST LOW 20 MIN: CPT | Performed by: OBSTETRICS & GYNECOLOGY

## 2023-02-27 RX ORDER — DOXYCYCLINE HYCLATE 100 MG
100 TABLET ORAL 2 TIMES DAILY
Qty: 14 TABLET | Refills: 0 | Status: SHIPPED | OUTPATIENT
Start: 2023-02-27 | End: 2023-03-06

## 2023-03-08 ENCOUNTER — OFFICE VISIT (OUTPATIENT)
Dept: OBGYN CLINIC | Facility: CLINIC | Age: 43
End: 2023-03-08
Payer: COMMERCIAL

## 2023-03-08 VITALS
SYSTOLIC BLOOD PRESSURE: 106 MMHG | WEIGHT: 149 LBS | BODY MASS INDEX: 25.44 KG/M2 | DIASTOLIC BLOOD PRESSURE: 72 MMHG | HEIGHT: 64 IN

## 2023-03-08 DIAGNOSIS — Z12.4 ROUTINE CERVICAL SMEAR: Primary | ICD-10-CM

## 2023-03-08 DIAGNOSIS — Z01.419 WOMEN'S ANNUAL ROUTINE GYNECOLOGICAL EXAMINATION: ICD-10-CM

## 2023-03-08 PROCEDURE — 99396 PREV VISIT EST AGE 40-64: CPT | Performed by: OBSTETRICS & GYNECOLOGY

## 2023-03-08 PROCEDURE — 3008F BODY MASS INDEX DOCD: CPT | Performed by: OBSTETRICS & GYNECOLOGY

## 2023-03-08 PROCEDURE — 3074F SYST BP LT 130 MM HG: CPT | Performed by: OBSTETRICS & GYNECOLOGY

## 2023-03-08 PROCEDURE — 3078F DIAST BP <80 MM HG: CPT | Performed by: OBSTETRICS & GYNECOLOGY

## 2023-03-08 PROCEDURE — 87624 HPV HI-RISK TYP POOLED RSLT: CPT | Performed by: OBSTETRICS & GYNECOLOGY

## 2023-03-09 LAB — HPV I/H RISK 1 DNA SPEC QL NAA+PROBE: NEGATIVE

## 2023-03-24 RX ORDER — CLINDAMYCIN HYDROCHLORIDE 300 MG/1
300 CAPSULE ORAL 2 TIMES DAILY
Qty: 14 CAPSULE | Refills: 0 | Status: SHIPPED | OUTPATIENT
Start: 2023-03-24 | End: 2023-03-31

## 2024-03-12 ENCOUNTER — OFFICE VISIT (OUTPATIENT)
Dept: OBGYN CLINIC | Facility: CLINIC | Age: 44
End: 2024-03-12
Payer: COMMERCIAL

## 2024-03-12 VITALS
BODY MASS INDEX: 26.32 KG/M2 | SYSTOLIC BLOOD PRESSURE: 104 MMHG | HEIGHT: 64 IN | WEIGHT: 154.19 LBS | DIASTOLIC BLOOD PRESSURE: 60 MMHG

## 2024-03-12 DIAGNOSIS — N87.0 DYSPLASIA OF CERVIX, LOW GRADE (CIN 1): ICD-10-CM

## 2024-03-12 DIAGNOSIS — Z01.419 WOMEN'S ANNUAL ROUTINE GYNECOLOGICAL EXAMINATION: ICD-10-CM

## 2024-03-12 DIAGNOSIS — Z12.4 SCREENING FOR CERVICAL CANCER: Primary | ICD-10-CM

## 2024-03-12 PROCEDURE — 3078F DIAST BP <80 MM HG: CPT | Performed by: OBSTETRICS & GYNECOLOGY

## 2024-03-12 PROCEDURE — 3008F BODY MASS INDEX DOCD: CPT | Performed by: OBSTETRICS & GYNECOLOGY

## 2024-03-12 PROCEDURE — 87624 HPV HI-RISK TYP POOLED RSLT: CPT | Performed by: OBSTETRICS & GYNECOLOGY

## 2024-03-12 PROCEDURE — 99396 PREV VISIT EST AGE 40-64: CPT | Performed by: OBSTETRICS & GYNECOLOGY

## 2024-03-12 PROCEDURE — 3074F SYST BP LT 130 MM HG: CPT | Performed by: OBSTETRICS & GYNECOLOGY

## 2024-03-12 NOTE — PROGRESS NOTES
GYN ANNUAL    3/12/2024  3:24 PM    Chief Complaint   Patient presents with    Annual     Annual exam/pap    .    HPI: Patient is a 43 year old  LMP  presents for annual gyn exam and PAP. Cycles starting to become irregular but no long duration of absence. Reports no gynecologic issues or complaints. No pelvic pain. No abnormal vaginal discharge or bleeding.       OB History    Para Term  AB Living   4 2 2   2 2   SAB IAB Ectopic Multiple Live Births   0 2     2      # Outcome Date GA Lbr Vishal/2nd Weight Sex Delivery Anes PTL Lv   4 Term    6 lb (2.722 kg) M NORMAL SPONT   ROMY   3 Term    6 lb (2.722 kg) M NORMAL SPONT   ROMY   2 IAB            1 IAB                  GYN hx:    Hx Prior Abnormal Pap: Yes (-lsil with colpo done )  Pap Date: 23  Pap Result Notes: wnl/-hpv  Follow Up Recommendation: No further mammograms = R + L mastectomies ( + ) pt had PET scan of breast done 21  CONTRACEPTION: N/A  LAST MAMMOGRAM: N/A      Current Outpatient Medications   Medication Sig Dispense Refill    levothyroxine 75 MCG Oral Tab Take 1 tablet (75 mcg total) by mouth every morning.      Zolpidem Tartrate 10 MG Oral Tab       LORazepam 1 MG Oral Tab TK 1 T PO  Q 6 HOURS PRN  5    albuterol 108 (90 Base) MCG/ACT Inhalation Aero Soln Inhale 2 puffs into the lungs.      ibuprofen 600 MG Oral Tab TK 1 T PO  Q 8 HOURS PRF PAIN  5    EPINEPHrine 0.3 MG/0.3ML Injection Solution Auto-injector Inject 0.3 mL (1 each total) into the muscle.         Past Medical History:   Diagnosis Date    Allergic     ASCUS with positive high risk HPV cervical 2020    Asthma (HCC)     Breast cancer (HCC)     STAGE 4    BV (bacterial vaginosis)     Chlamydia     COVID 2021    Disorder of thyroid     Exposure to medical diagnostic radiation     History of blood transfusion     History of use of contraceptive intrauterine device (IUD) 2014    removed 02/10/2017    Human papilloma virus  infection     Low grade squamous intraepithelial lesion (LGSIL) on Papanicolaou smear of cervix     Neuropathy     Personal history of antineoplastic chemotherapy     Visual impairment     Yeast infection      Past Surgical History:   Procedure Laterality Date    ANESTHESIA FOR;  PROCEDURES      ANESTHESIA FOR;  PROCEDURES      BREAST RECONSTRUCTION Right 2015    DEVELOPED HEMATOMA    COLPOSCOPY, CERVIX W/UPPER ADJACENT VAGINA; W/BIOPSY(S), CERVIX      COLPOSCOPY, CERVIX W/UPPER ADJACENT VAGINA; W/BIOPSY(S), CERVIX      DILATION/CURETTAGE,DIAGNOSTIC  2023    HYSTEROSCOPY      LEEP  2010    MASTECTOMY LEFT Left 2018    with reconstruction     MASTECTOMY RIGHT Right 2013     Allergies   Allergen Reactions    Fish-Derived Products HIVES    Penicillins HIVES and RASH    Tree Nuts      Family History   Problem Relation Age of Onset    Breast Cancer Maternal Grandmother 77     Social History     Socioeconomic History    Marital status: Single   Occupational History    Occupation: sales   Tobacco Use    Smoking status: Never    Smokeless tobacco: Never   Vaping Use    Vaping Use: Never used   Substance and Sexual Activity    Alcohol use: Yes     Comment: SOCIALLY    Drug use: No    Sexual activity: Yes     Partners: Male     Birth control/protection: Condom     Social History     Social History Narrative    Live with son    No history of abuse        ROS:     Review of Systems:  A comprehensive 10 point ROS was completed. All pertinent positives and negatives noted in the HPI        /60   Ht 64\"   Wt 154 lb 3.2 oz (69.9 kg)   LMP 2022   BMI 26.47 kg/m²     Exam:   GENERAL: well developed, well nourished, in no apparent distress  SKIN: no rashes, no lesions  HEENT: normal  LUNGS: respiration unlabored  CARDIOVASCULAR: no peripheral edema or varicosities, skin warm and dry  ABDOMEN: Soft, non distended; non tender, no masses  GYNE/:   External Genitalia: normal, no  lesions, good perineal support  Urethra: meatus normal  Bladder: well supported  Vagina: normal mucosa, no lesions, no discharge   Uterus: normal size, mobile, nontender  Cervix:  normal os, no lesions or bleeding  Adnexa: normal size, bilaterally nontender, no palpable masses  Cul-de-sac: normal  R/V: normal perineum, no hemorrhoids  EXTREMITIES: nontender without edema      A/P: Patient is 43 year old female here for well-woman exam.     1. Women's annual routine gynecological examination  - Normal    2. Screening for cervical cancer   - PAP today        3/12/2024  Carol Leon MD

## 2024-03-13 LAB — HPV I/H RISK 1 DNA SPEC QL NAA+PROBE: NEGATIVE

## 2024-08-28 ENCOUNTER — TELEPHONE (OUTPATIENT)
Dept: OBGYN CLINIC | Facility: CLINIC | Age: 44
End: 2024-08-28

## 2024-08-28 NOTE — TELEPHONE ENCOUNTER
Per in office tickler file pt due in 09/2024 for 6 month repeat pap. Appointment scheduled see below.    [facility] Future Appointments    Encounter Information   Provider Department Center   9/10/2024 3:00 PM Edward, Carol OROZCO MD [facility] - OB/GYN

## 2024-09-10 ENCOUNTER — OFFICE VISIT (OUTPATIENT)
Dept: OBGYN CLINIC | Facility: CLINIC | Age: 44
End: 2024-09-10
Payer: COMMERCIAL

## 2024-09-10 VITALS
DIASTOLIC BLOOD PRESSURE: 74 MMHG | HEIGHT: 64 IN | SYSTOLIC BLOOD PRESSURE: 118 MMHG | WEIGHT: 154 LBS | BODY MASS INDEX: 26.29 KG/M2

## 2024-09-10 DIAGNOSIS — R87.610 ASCUS OF CERVIX WITH NEGATIVE HIGH RISK HPV: Primary | ICD-10-CM

## 2024-09-10 DIAGNOSIS — Z11.3 SCREENING EXAMINATION FOR VENEREAL DISEASE: ICD-10-CM

## 2024-09-10 PROCEDURE — 3078F DIAST BP <80 MM HG: CPT | Performed by: OBSTETRICS & GYNECOLOGY

## 2024-09-10 PROCEDURE — 87591 N.GONORRHOEAE DNA AMP PROB: CPT | Performed by: OBSTETRICS & GYNECOLOGY

## 2024-09-10 PROCEDURE — 3008F BODY MASS INDEX DOCD: CPT | Performed by: OBSTETRICS & GYNECOLOGY

## 2024-09-10 PROCEDURE — 3074F SYST BP LT 130 MM HG: CPT | Performed by: OBSTETRICS & GYNECOLOGY

## 2024-09-10 PROCEDURE — 87624 HPV HI-RISK TYP POOLED RSLT: CPT | Performed by: OBSTETRICS & GYNECOLOGY

## 2024-09-10 PROCEDURE — 81514 NFCT DS BV&VAGINITIS DNA ALG: CPT | Performed by: OBSTETRICS & GYNECOLOGY

## 2024-09-10 PROCEDURE — 87491 CHLMYD TRACH DNA AMP PROBE: CPT | Performed by: OBSTETRICS & GYNECOLOGY

## 2024-09-10 PROCEDURE — 99213 OFFICE O/P EST LOW 20 MIN: CPT | Performed by: OBSTETRICS & GYNECOLOGY

## 2024-09-10 PROCEDURE — 99459 PELVIC EXAMINATION: CPT | Performed by: OBSTETRICS & GYNECOLOGY

## 2024-09-10 NOTE — PROGRESS NOTES
Rossana Singh is a 43 year old female.    HPI:     Chief Complaint   Patient presents with    Follow Up Pap     6 month repeat pap for ascus/-hpv    Sexually Transmitted Infection Screen     Std screen      Doing well, here for repeat PAP for 3/12/2024 ASCUS/-HPV and for STD screen. Cycles less regular but no perimenopausal symptoms.    Medications (Active prior to today's visit):  Current Outpatient Medications   Medication Sig Dispense Refill    levothyroxine 75 MCG Oral Tab Take 1 tablet (75 mcg total) by mouth every morning.      Zolpidem Tartrate 10 MG Oral Tab       LORazepam 1 MG Oral Tab TK 1 T PO  Q 6 HOURS PRN  5    albuterol 108 (90 Base) MCG/ACT Inhalation Aero Soln Inhale 2 puffs into the lungs.      ibuprofen 600 MG Oral Tab TK 1 T PO  Q 8 HOURS PRF PAIN  5    EPINEPHrine 0.3 MG/0.3ML Injection Solution Auto-injector Inject 0.3 mL (1 each total) into the muscle.         Allergies:  Allergies   Allergen Reactions    Fish-Derived Products HIVES    Penicillins HIVES and RASH    Tree Nuts        /74   Ht 64\"   Wt 154 lb (69.9 kg)   LMP 12/04/2022   BMI 26.43 kg/m²     PHYSICAL EXAM:   GENERAL: Well developed, well nourished, in no apparent distress  GYNE/:   External Genitalia: normal, no lesions, good perineal support           Vagina: normal mucosa, no lesions, mucoid discharge cultured                 Cervix: PAP done, normal os, no lesions or bleeding                    R/V: normal perineum, no hemorrhoids  EXTREMITIES: nontender without edema      ASSESSMENT/PLAN:   Assessment       1. ASCUS of cervix with negative high risk HPV  - PAP today  - Repeat one year if normal    2. Screening examination for venereal disease  - Cultures obtained      Total time spent = 20 minutes  >50% of visit = face to face discussion and coordination of care        9/10/2024  Carol Leon MD

## (undated) DEVICE — COVER SGL STRL LGHT HNDL BLU

## (undated) DEVICE — Device: Brand: JELCO

## (undated) DEVICE — D AND C PACK: Brand: MEDLINE INDUSTRIES, INC.

## (undated) DEVICE — ENCORE® LATEX MICRO SIZE 6.5, STERILE LATEX POWDER-FREE SURGICAL GLOVE: Brand: ENCORE

## (undated) DEVICE — STERILE SURGICAL LUBRICANT, METAL TUBE: Brand: SURGILUBE

## (undated) DEVICE — SOL NACL IRRIG 0.9% 1000ML BTL

## (undated) NOTE — MR AVS SNAPSHOT
After Visit Summary   2/10/2017    Grigsby Males    MRN: [id number]           Visit Information        Provider Department Dept Phone    2/10/2017  2:30 PM Higinio Canavan, MD [facility]  [phone]      Your Vitals Were     BP Ht Wt BMI Thank you for enrolling in [address] Please follow the instructions below to securely access your online medical record. [employer] allows you to send messages to your doctor, view test results, renew prescriptions and request appointments.       How Do I Sign If you receive a survey from [employer], please take a few minutes to complete it and provide feedback. We strive to deliver the best patient experience and are looking for ways to make improvements. Your feedback will help us do so.  For more information

## (undated) NOTE — MR AVS SNAPSHOT
After Visit Summary   3/12/2024    Rossana Singh   MRN: [id number]           Visit Information     Date & Time  3/12/2024  3:00 PM Provider  Carol Leon MD [facility] - OB/GYN Dept. Phone  [phone]      Your Vitals Were  Most recent update: 3/12/2024  3:16 PM    BP   104/60    Ht   64\"    Wt   154 lb 3.2 oz    LMP   12/04/2022    BMI   26.47 kg/m²         Allergies as of 3/12/2024  Review status set to Review Complete on 3/12/2024       Noted Reaction Type Reactions    Fish-derived Products 10/30/2012    HIVES    Penicillins 06/14/2011    HIVES, RASH    Tree Nuts 02/10/2017          Your Current Medications        Dosage    levothyroxine 75 MCG Oral Tab Take 1 tablet (75 mcg total) by mouth every morning.    Zolpidem Tartrate 10 MG Oral Tab     LORazepam 1 MG Oral Tab TK 1 T PO  Q 6 HOURS PRN    albuterol 108 (90 Base) MCG/ACT Inhalation Aero Soln Inhale 2 puffs into the lungs.    ibuprofen 600 MG Oral Tab TK 1 T PO  Q 8 HOURS PRF PAIN    EPINEPHrine 0.3 MG/0.3ML Injection Solution Auto-injector Inject 0.3 mL (1 each total) into the muscle.      Diagnoses for This Visit    Screening for cervical cancer   [046942]  -  Primary  Women's annual routine gynecological examination   [2361502]    Dysplasia of cervix, low grade (NIKA 1)   [804294]             We Ordered the Following     Normal Orders This Visit    Hpv Dna  High Risk , Thin Prep Collect [[id number] CUSTOM]     THINPREP PAP SMEAR ONLY [[id number] CUSTOM]     ThinPrep PAP Smear [[id number] CUSTOM]     Future Labs/Procedures Expected by Expires    Hpv Dna  High Risk , Thin Prep Collect [[id number] CUSTOM]  3/12/2024 3/12/2025    ThinPrep PAP Smear [[id number] CUSTOM]  3/12/2024 3/12/2025                [place] [facility] [place] offer Video Visits through [employer] for adult patients for a variety of conditions such as allergies, back pain and cold symptoms? Skip the drive  and waiting room and online chat with a doctor face-to-face using your web-cam enabled computer or mobile device wherever you are. Video Visits cost $50 and can be paid hassle-free using a credit, debit, or health savings card.  Not active on [employer]? Ask us how to get signed up today!          If you receive a survey from Juliane Webster, please take a few minutes to complete it and provide feedback. We strive to deliver the best patient experience and are looking for ways to make improvements. Your feedback will help us do so. For more information on [employer], please visit [email or web address]           No text in SmartText           No text in SmartText

## (undated) NOTE — LETTER
Rossana Singh, :1980    CONSENT FOR PROCEDURE/SEDATION    1. I authorize the performance upon Jeny Riley  the following: Colposcopy with biopsy and Endocervical curettage    2.  I authorize Dr. Glenn Prabhakar MD (and whomever is designated Relationship to patient: ____________________________________________    Witness: _________________________________________ Date:___________     Physician Signature: _______________________________ Date:___________

## (undated) NOTE — Clinical Note
02/15/2017    To: Seb Males    Re: Test Results      Thank you for your recent visit to our office. We have received your test results, which were done on   02/10/2017.      PAP Test:    Your results are normal.        HPV (Human papillomavirus) Keli

## (undated) NOTE — MR AVS SNAPSHOT
After Visit Summary   1/5/2022    Rosalba Garcia   MRN: [id number]           Visit Information     Date & Time  1/5/2022  4:30 PM Provider  Magaly Smiley MD [facility] Dept.  Phon HPV HIGH RISK , THIN PREP COLLECTION [[id number] CUSTOM]  1/5/2022 1/5/2023    T PALLIDUM SCREENING CASCADE [3353557 CPT(R)]  1/5/2022 (Approximate) 1/5/2023    THINPREP PAP SMEAR B [[id number] CUSTOM]  1/5/2022 1/5/2023      Follow-up Instructions    Return

## (undated) NOTE — MR AVS SNAPSHOT
After Visit Summary   8/13/2020    Joseph Frank    MRN: [id number]           Visit Information     Date & Time  8/13/2020  3:00 PM Provider  Erasmo Cosby MD [address] [facility] Dept.  Phone  [phone] GENITAL VAGINOSIS SCREEN [[id number] CUSTOM]     HPV HIGH RISK , THIN PREP COLLECTION [[id number] CUSTOM]     THINPREP PAP SMEAR B [[id number] CUSTOM]     THINPREP PAP SMEAR ONLY [[id number] CUSTOM]     Future Labs/Procedures Expected by Expires    CHLAMYDIA/GONOCOCC attention VIDEO VISITS  Average cost  $35*    e-VISTS  Average cost  $35*     SAME DAY APPOINTMENTS   Available at primary care offices    AFTER HOURS CARE  [place]  OFFICE VISIT   Primary Care Providers  Treatment for mild illness or injury that does not